# Patient Record
Sex: MALE | Race: WHITE | URBAN - METROPOLITAN AREA
[De-identification: names, ages, dates, MRNs, and addresses within clinical notes are randomized per-mention and may not be internally consistent; named-entity substitution may affect disease eponyms.]

---

## 2017-02-28 ENCOUNTER — IMPORTED ENCOUNTER (OUTPATIENT)
Dept: URBAN - METROPOLITAN AREA CLINIC 50 | Facility: CLINIC | Age: 77
End: 2017-02-28

## 2017-08-29 ENCOUNTER — IMPORTED ENCOUNTER (OUTPATIENT)
Dept: URBAN - METROPOLITAN AREA CLINIC 50 | Facility: CLINIC | Age: 77
End: 2017-08-29

## 2018-04-26 ENCOUNTER — IMPORTED ENCOUNTER (OUTPATIENT)
Dept: URBAN - METROPOLITAN AREA CLINIC 50 | Facility: CLINIC | Age: 78
End: 2018-04-26

## 2018-04-26 NOTE — PATIENT DISCUSSION
"""PCO(s) are not visually significant for capsulotomy.  Monitor by regular examinations. """ Ambulatory

## 2018-11-06 ENCOUNTER — IMPORTED ENCOUNTER (OUTPATIENT)
Dept: URBAN - METROPOLITAN AREA CLINIC 50 | Facility: CLINIC | Age: 78
End: 2018-11-06

## 2019-05-07 ENCOUNTER — IMPORTED ENCOUNTER (OUTPATIENT)
Dept: URBAN - METROPOLITAN AREA CLINIC 50 | Facility: CLINIC | Age: 79
End: 2019-05-07

## 2019-05-07 NOTE — PATIENT DISCUSSION
"""Continue Artificial tears both eyes two - four times a day ."" ""Start Gel drops both eyes at bedtime ."""

## 2019-11-05 ENCOUNTER — IMPORTED ENCOUNTER (OUTPATIENT)
Dept: URBAN - METROPOLITAN AREA CLINIC 50 | Facility: CLINIC | Age: 79
End: 2019-11-05

## 2020-06-30 ENCOUNTER — IMPORTED ENCOUNTER (OUTPATIENT)
Dept: URBAN - METROPOLITAN AREA CLINIC 50 | Facility: CLINIC | Age: 80
End: 2020-06-30

## 2021-02-04 ENCOUNTER — IMPORTED ENCOUNTER (OUTPATIENT)
Dept: URBAN - METROPOLITAN AREA CLINIC 50 | Facility: CLINIC | Age: 81
End: 2021-02-04

## 2021-02-04 NOTE — PATIENT DISCUSSION
"""Follow dry ARMD without treatment. MVI/AREDS/Amsler. Patient to call if vision changes or distortion increases.  Good diet/do not smoke."" ""Start AREDS by mouth

## 2021-04-17 ASSESSMENT — VISUAL ACUITY
OS_OTHER: 20/50. 20/100.
OS_CC: 20/20-1
OD_CC: 20/20-1
OS_CC: J1+
OD_CC: J1+
OS_CC: J1+
OD_OTHER: 20/25. 20/30.
OS_SC: 20/25
OS_BAT: 20/25
OS_SC: 20/25-
OD_CC: 20/20
OS_BAT: 20/60
OS_CC: J1+
OS_OTHER: 20/40. 20/70.
OS_CC: J3@ 16 IN
OD_CC: 20/25
OD_OTHER: 20/80. 20/100.
OD_SC: 20/25+
OD_BAT: 20/25
OS_CC: 20/25+1
OD_OTHER: 20/80. 20/100.
OS_CC: 20/25
OD_OTHER: 20/30. 20/50.
OD_BAT: 20/30
OD_CC: J1+
OD_OTHER: 20/40. 20/40.
OD_CC: J1+
OS_SC: 20/25+2
OS_OTHER: 20/20. 20/25.
OD_BAT: 20/80
OS_CC: 20/20-1
OD_SC: 20/20
OD_CC: J1+
OS_BAT: 20/30
OD_CC: 20/20-1
OD_CC: J3@ 16 IN
OS_OTHER: 20/60. 20/100.
OS_BAT: 20/50
OD_BAT: 20/80
OD_OTHER: 20/60. 20/200.
OS_SC: 20/25
OS_OTHER: 20/30. 20/40.
OS_BAT: 20/20
OD_BAT: 20/40
OD_SC: 20/25+2
OD_CC: J1+
OS_CC: J1+
OS_BAT: 20/40
OS_OTHER: 20/25. 20/40.
OD_SC: 20/20-
OS_CC: J1+
OD_BAT: 20/60

## 2021-04-17 ASSESSMENT — TONOMETRY
OD_IOP_MMHG: 14
OD_IOP_MMHG: 14
OD_IOP_MMHG: 13
OD_IOP_MMHG: 14
OS_IOP_MMHG: 13
OS_IOP_MMHG: 13
OS_IOP_MMHG: 14
OS_IOP_MMHG: 14
OD_IOP_MMHG: 14
OD_IOP_MMHG: 12
OD_IOP_MMHG: 14
OS_IOP_MMHG: 12
OS_IOP_MMHG: 14
OS_IOP_MMHG: 13
OD_IOP_MMHG: 13
OS_IOP_MMHG: 13

## 2021-07-26 ENCOUNTER — PREPPED CHART (OUTPATIENT)
Dept: URBAN - METROPOLITAN AREA CLINIC 49 | Facility: CLINIC | Age: 81
End: 2021-07-26

## 2021-07-28 ENCOUNTER — 6 MONTH COMPREHENSIVE EXAM (OUTPATIENT)
Dept: URBAN - METROPOLITAN AREA CLINIC 49 | Facility: CLINIC | Age: 81
End: 2021-07-28

## 2021-07-28 DIAGNOSIS — H35.373: ICD-10-CM

## 2021-07-28 DIAGNOSIS — H04.123: ICD-10-CM

## 2021-07-28 DIAGNOSIS — H43.813: ICD-10-CM

## 2021-07-28 DIAGNOSIS — H35.3131: ICD-10-CM

## 2021-07-28 DIAGNOSIS — H26.493: ICD-10-CM

## 2021-07-28 DIAGNOSIS — E11.9: ICD-10-CM

## 2021-07-28 PROCEDURE — 92014 COMPRE OPH EXAM EST PT 1/>: CPT

## 2021-07-28 PROCEDURE — 92134 CPTRZ OPH DX IMG PST SGM RTA: CPT

## 2021-07-28 ASSESSMENT — VISUAL ACUITY
OD_PH: 20/25-1
OD_GLARE: 20/25
OS_SC: 20/25
OU_CC: J1+
OD_SC: 20/30-1
OS_GLARE: 20/25
OS_GLARE: 20/25
OD_GLARE: 20/30

## 2021-07-28 ASSESSMENT — TONOMETRY
OD_IOP_MMHG: 14
OS_IOP_MMHG: 14

## 2022-02-03 ENCOUNTER — ESTABLISHED PATIENT (OUTPATIENT)
Dept: URBAN - METROPOLITAN AREA CLINIC 49 | Facility: CLINIC | Age: 82
End: 2022-02-03

## 2022-02-03 DIAGNOSIS — H26.493: ICD-10-CM

## 2022-02-03 DIAGNOSIS — H43.813: ICD-10-CM

## 2022-02-03 DIAGNOSIS — H35.3130: ICD-10-CM

## 2022-02-03 DIAGNOSIS — H35.373: ICD-10-CM

## 2022-02-03 DIAGNOSIS — E11.9: ICD-10-CM

## 2022-02-03 PROCEDURE — 92014 COMPRE OPH EXAM EST PT 1/>: CPT

## 2022-02-03 PROCEDURE — 92134 CPTRZ OPH DX IMG PST SGM RTA: CPT

## 2022-02-03 ASSESSMENT — TONOMETRY
OS_IOP_MMHG: 12
OD_IOP_MMHG: 12

## 2022-02-03 ASSESSMENT — VISUAL ACUITY
OS_GLARE: 20/25
OD_GLARE: 20/40
OD_PH: 20/25-
OS_GLARE: 20/40
OD_GLARE: 20/60
OU_CC: J1+/-
OS_SC: 20/25-2
OD_SC: 20/30

## 2022-10-05 ENCOUNTER — ESTABLISHED PATIENT (OUTPATIENT)
Dept: URBAN - METROPOLITAN AREA CLINIC 49 | Facility: CLINIC | Age: 82
End: 2022-10-05

## 2022-10-05 DIAGNOSIS — E11.9: ICD-10-CM

## 2022-10-05 DIAGNOSIS — H26.493: ICD-10-CM

## 2022-10-05 DIAGNOSIS — H35.373: ICD-10-CM

## 2022-10-05 DIAGNOSIS — H35.3130: ICD-10-CM

## 2022-10-05 PROCEDURE — 92014 COMPRE OPH EXAM EST PT 1/>: CPT

## 2022-10-05 ASSESSMENT — VISUAL ACUITY
OD_SC: 20/40+1
OD_GLARE: 20/30
OS_GLARE: 20/40
OD_GLARE: 20/40
OS_PH: 20/25
OS_SC: 20/30+2
OD_PH: 20/25
OU_CC: J1+ @ 14IN
OU_SC: 20/30-1
OS_GLARE: 20/40

## 2022-10-05 ASSESSMENT — TONOMETRY
OS_IOP_MMHG: 15
OD_IOP_MMHG: 15

## 2023-10-05 ENCOUNTER — COMPREHENSIVE EXAM (OUTPATIENT)
Dept: URBAN - METROPOLITAN AREA CLINIC 49 | Facility: LOCATION | Age: 83
End: 2023-10-05

## 2023-10-05 DIAGNOSIS — H35.373: ICD-10-CM

## 2023-10-05 DIAGNOSIS — H04.123: ICD-10-CM

## 2023-10-05 DIAGNOSIS — H43.813: ICD-10-CM

## 2023-10-05 DIAGNOSIS — E11.9: ICD-10-CM

## 2023-10-05 DIAGNOSIS — H35.3132: ICD-10-CM

## 2023-10-05 DIAGNOSIS — H26.493: ICD-10-CM

## 2023-10-05 PROCEDURE — 92134 CPTRZ OPH DX IMG PST SGM RTA: CPT

## 2023-10-05 PROCEDURE — 99214 OFFICE O/P EST MOD 30 MIN: CPT

## 2023-10-05 ASSESSMENT — TONOMETRY
OD_IOP_MMHG: 14
OS_IOP_MMHG: 13

## 2023-10-05 ASSESSMENT — VISUAL ACUITY
OU_SC: 20/30
OS_GLARE: 20/30
OD_GLARE: 20/30
OD_GLARE: 20/40
OS_GLARE: 20/30
OS_SC: 20/30
OU_CC: J1+@18IN
OD_SC: 20/30-2 PUSH

## 2024-11-07 ENCOUNTER — COMPREHENSIVE EXAM (OUTPATIENT)
Dept: URBAN - METROPOLITAN AREA CLINIC 49 | Facility: LOCATION | Age: 84
End: 2024-11-07

## 2024-11-07 DIAGNOSIS — H04.123: ICD-10-CM

## 2024-11-07 DIAGNOSIS — E11.9: ICD-10-CM

## 2024-11-07 DIAGNOSIS — H26.493: ICD-10-CM

## 2024-11-07 DIAGNOSIS — H35.373: ICD-10-CM

## 2024-11-07 DIAGNOSIS — H43.813: ICD-10-CM

## 2024-11-07 DIAGNOSIS — H35.3132: ICD-10-CM

## 2024-11-07 PROCEDURE — 92134 CPTRZ OPH DX IMG PST SGM RTA: CPT

## 2024-11-07 PROCEDURE — 99214 OFFICE O/P EST MOD 30 MIN: CPT
